# Patient Record
Sex: MALE | Race: WHITE | NOT HISPANIC OR LATINO | Employment: FULL TIME | URBAN - METROPOLITAN AREA
[De-identification: names, ages, dates, MRNs, and addresses within clinical notes are randomized per-mention and may not be internally consistent; named-entity substitution may affect disease eponyms.]

---

## 2017-08-13 ENCOUNTER — HOSPITAL ENCOUNTER (OUTPATIENT)
Dept: GENERAL RADIOLOGY | Facility: HOSPITAL | Age: 52
Discharge: HOME | End: 2017-08-13
Admitting: SURGERY

## 2017-08-13 ENCOUNTER — APPOINTMENT (OUTPATIENT)
Dept: PREADMISSION TESTING | Facility: HOSPITAL | Age: 52
End: 2017-08-13

## 2017-08-13 VITALS — BODY MASS INDEX: 25.49 KG/M2 | WEIGHT: 168.21 LBS | HEIGHT: 68 IN

## 2017-08-13 LAB
ANION GAP SERPL CALCULATED.3IONS-SCNC: 7 MMOL/L (ref 3–11)
BACTERIA UR QL AUTO: NORMAL /HPF
BILIRUB UR QL STRIP: NEGATIVE
BUN BLD-MCNC: 15 MG/DL (ref 9–23)
BUN/CREAT SERPL: 15 (ref 7–25)
CALCIUM SPEC-SCNC: 9.4 MG/DL (ref 8.7–10.4)
CHLORIDE SERPL-SCNC: 107 MMOL/L (ref 99–109)
CLARITY UR: ABNORMAL
CO2 SERPL-SCNC: 27 MMOL/L (ref 20–31)
COLOR UR: YELLOW
CREAT BLD-MCNC: 1 MG/DL (ref 0.6–1.3)
DEPRECATED RDW RBC AUTO: 44.7 FL (ref 37–54)
ERYTHROCYTE [DISTWIDTH] IN BLOOD BY AUTOMATED COUNT: 12.8 % (ref 11.3–14.5)
GFR SERPL CREATININE-BSD FRML MDRD: 79 ML/MIN/1.73
GLUCOSE BLD-MCNC: 98 MG/DL (ref 70–100)
GLUCOSE UR STRIP-MCNC: NEGATIVE MG/DL
HBA1C MFR BLD: 5 % (ref 4.8–5.6)
HCT VFR BLD AUTO: 44.6 % (ref 38.9–50.9)
HGB BLD-MCNC: 15.3 G/DL (ref 13.1–17.5)
HGB UR QL STRIP.AUTO: NEGATIVE
HYALINE CASTS UR QL AUTO: NORMAL /LPF
KETONES UR QL STRIP: NEGATIVE
LEUKOCYTE ESTERASE UR QL STRIP.AUTO: NEGATIVE
MCH RBC QN AUTO: 32.5 PG (ref 27–31)
MCHC RBC AUTO-ENTMCNC: 34.3 G/DL (ref 32–36)
MCV RBC AUTO: 94.7 FL (ref 80–99)
NITRITE UR QL STRIP: NEGATIVE
PH UR STRIP.AUTO: 7 [PH] (ref 5–8)
PLATELET # BLD AUTO: 255 10*3/MM3 (ref 150–450)
PMV BLD AUTO: 9.1 FL (ref 6–12)
POTASSIUM BLD-SCNC: 4.5 MMOL/L (ref 3.5–5.5)
PROT UR QL STRIP: NEGATIVE
RBC # BLD AUTO: 4.71 10*6/MM3 (ref 4.2–5.76)
RBC # UR: NORMAL /HPF
REF LAB TEST METHOD: NORMAL
SODIUM BLD-SCNC: 141 MMOL/L (ref 132–146)
SP GR UR STRIP: 1.02 (ref 1–1.03)
SQUAMOUS #/AREA URNS HPF: NORMAL /HPF
UROBILINOGEN UR QL STRIP: ABNORMAL
WBC NRBC COR # BLD: 8.12 10*3/MM3 (ref 3.5–10.8)
WBC UR QL AUTO: NORMAL /HPF

## 2017-08-13 PROCEDURE — 93010 ELECTROCARDIOGRAM REPORT: CPT | Performed by: INTERNAL MEDICINE

## 2017-08-13 PROCEDURE — 36415 COLL VENOUS BLD VENIPUNCTURE: CPT

## 2017-08-13 PROCEDURE — 83036 HEMOGLOBIN GLYCOSYLATED A1C: CPT | Performed by: SURGERY

## 2017-08-13 PROCEDURE — 71020 HC CHEST PA AND LATERAL: CPT

## 2017-08-13 PROCEDURE — 93005 ELECTROCARDIOGRAM TRACING: CPT

## 2017-08-13 PROCEDURE — 81001 URINALYSIS AUTO W/SCOPE: CPT | Performed by: SURGERY

## 2017-08-13 PROCEDURE — 85027 COMPLETE CBC AUTOMATED: CPT | Performed by: SURGERY

## 2017-08-13 PROCEDURE — 80048 BASIC METABOLIC PNL TOTAL CA: CPT | Performed by: SURGERY

## 2017-08-13 RX ORDER — ASPIRIN 81 MG/1
81 TABLET ORAL DAILY
COMMUNITY

## 2017-08-13 RX ORDER — ATORVASTATIN CALCIUM 80 MG/1
80 TABLET, FILM COATED ORAL DAILY
COMMUNITY

## 2017-08-14 ENCOUNTER — ANESTHESIA (OUTPATIENT)
Dept: PERIOP | Facility: HOSPITAL | Age: 52
End: 2017-08-14

## 2017-08-14 ENCOUNTER — ANESTHESIA EVENT (OUTPATIENT)
Dept: PERIOP | Facility: HOSPITAL | Age: 52
End: 2017-08-14

## 2017-08-14 ENCOUNTER — APPOINTMENT (OUTPATIENT)
Dept: CARDIOLOGY | Facility: HOSPITAL | Age: 52
End: 2017-08-14
Attending: SURGERY

## 2017-08-14 ENCOUNTER — HOSPITAL ENCOUNTER (INPATIENT)
Facility: HOSPITAL | Age: 52
LOS: 1 days | Discharge: HOME | End: 2017-08-15
Attending: SURGERY | Admitting: SURGERY

## 2017-08-14 PROBLEM — K21.9 GERD (GASTROESOPHAGEAL REFLUX DISEASE): Status: ACTIVE | Noted: 2017-08-14

## 2017-08-14 PROBLEM — E78.5 HYPERLIPIDEMIA: Status: ACTIVE | Noted: 2017-08-14

## 2017-08-14 PROBLEM — G45.3 AMAUROSIS FUGAX OF RIGHT EYE: Status: ACTIVE | Noted: 2017-08-14

## 2017-08-14 PROBLEM — I65.29 CAROTID ARTERY STENOSIS: Status: ACTIVE | Noted: 2017-08-14

## 2017-08-14 PROBLEM — Z72.0 TOBACCO ABUSE: Status: ACTIVE | Noted: 2017-08-14

## 2017-08-14 PROCEDURE — 25010000002 DEXAMETHASONE PER 1 MG: Performed by: NURSE ANESTHETIST, CERTIFIED REGISTERED

## 2017-08-14 PROCEDURE — 25010000002 PROPOFOL 10 MG/ML EMULSION: Performed by: NURSE ANESTHETIST, CERTIFIED REGISTERED

## 2017-08-14 PROCEDURE — 25010000002 NEOSTIGMINE PER 0.5 MG: Performed by: NURSE ANESTHETIST, CERTIFIED REGISTERED

## 2017-08-14 PROCEDURE — 03UK0JZ SUPPLEMENT RIGHT INTERNAL CAROTID ARTERY WITH SYNTHETIC SUBSTITUTE, OPEN APPROACH: ICD-10-PCS | Performed by: SURGERY

## 2017-08-14 PROCEDURE — 25010000002 HEPARIN (PORCINE) PER 1000 UNITS: Performed by: NURSE ANESTHETIST, CERTIFIED REGISTERED

## 2017-08-14 PROCEDURE — 25010000002 PHENYLEPHRINE PER 1 ML: Performed by: NURSE ANESTHETIST, CERTIFIED REGISTERED

## 2017-08-14 PROCEDURE — 93880 EXTRACRANIAL BILAT STUDY: CPT

## 2017-08-14 PROCEDURE — 25010000002 FENTANYL CITRATE (PF) 100 MCG/2ML SOLUTION: Performed by: NURSE ANESTHETIST, CERTIFIED REGISTERED

## 2017-08-14 PROCEDURE — C1768 GRAFT, VASCULAR: HCPCS | Performed by: SURGERY

## 2017-08-14 PROCEDURE — 25010000002 ONDANSETRON PER 1 MG: Performed by: NURSE ANESTHETIST, CERTIFIED REGISTERED

## 2017-08-14 PROCEDURE — 99232 SBSQ HOSP IP/OBS MODERATE 35: CPT | Performed by: INTERNAL MEDICINE

## 2017-08-14 PROCEDURE — 03CK0ZZ EXTIRPATION OF MATTER FROM RIGHT INTERNAL CAROTID ARTERY, OPEN APPROACH: ICD-10-PCS | Performed by: SURGERY

## 2017-08-14 PROCEDURE — 25010000002 HEPARIN (PORCINE) PER 1000 UNITS: Performed by: SURGERY

## 2017-08-14 PROCEDURE — 25010000003 CEFAZOLIN IN DEXTROSE 2-4 GM/100ML-% SOLUTION: Performed by: SURGERY

## 2017-08-14 DEVICE — THIN WALL CAROTID PATCH GELATIN IMPREGNATED THIN WALL KNITTED CAROTID PATCH TAPERED PATCH
Type: IMPLANTABLE DEVICE | Site: CAROTID | Status: FUNCTIONAL
Brand: THINWALL

## 2017-08-14 RX ORDER — DEXAMETHASONE SODIUM PHOSPHATE 4 MG/ML
INJECTION, SOLUTION INTRA-ARTICULAR; INTRALESIONAL; INTRAMUSCULAR; INTRAVENOUS; SOFT TISSUE AS NEEDED
Status: DISCONTINUED | OUTPATIENT
Start: 2017-08-14 | End: 2017-08-14 | Stop reason: SURG

## 2017-08-14 RX ORDER — FAMOTIDINE 20 MG/1
20 TABLET, FILM COATED ORAL ONCE
Status: COMPLETED | OUTPATIENT
Start: 2017-08-14 | End: 2017-08-14

## 2017-08-14 RX ORDER — SODIUM CHLORIDE 0.9 % (FLUSH) 0.9 %
1-10 SYRINGE (ML) INJECTION AS NEEDED
Status: DISCONTINUED | OUTPATIENT
Start: 2017-08-14 | End: 2017-08-14 | Stop reason: HOSPADM

## 2017-08-14 RX ORDER — SODIUM CHLORIDE, SODIUM LACTATE, POTASSIUM CHLORIDE, CALCIUM CHLORIDE 600; 310; 30; 20 MG/100ML; MG/100ML; MG/100ML; MG/100ML
75 INJECTION, SOLUTION INTRAVENOUS CONTINUOUS
Status: DISCONTINUED | OUTPATIENT
Start: 2017-08-14 | End: 2017-08-14

## 2017-08-14 RX ORDER — CEFAZOLIN SODIUM 2 G/100ML
2 INJECTION, SOLUTION INTRAVENOUS ONCE
Status: COMPLETED | OUTPATIENT
Start: 2017-08-14 | End: 2017-08-14

## 2017-08-14 RX ORDER — ONDANSETRON 2 MG/ML
INJECTION INTRAMUSCULAR; INTRAVENOUS AS NEEDED
Status: DISCONTINUED | OUTPATIENT
Start: 2017-08-14 | End: 2017-08-14 | Stop reason: SURG

## 2017-08-14 RX ORDER — SODIUM CHLORIDE, SODIUM LACTATE, POTASSIUM CHLORIDE, CALCIUM CHLORIDE 600; 310; 30; 20 MG/100ML; MG/100ML; MG/100ML; MG/100ML
10 INJECTION, SOLUTION INTRAVENOUS CONTINUOUS
Status: DISCONTINUED | OUTPATIENT
Start: 2017-08-14 | End: 2017-08-15 | Stop reason: HOSPADM

## 2017-08-14 RX ORDER — ACETAMINOPHEN 325 MG/1
650 TABLET ORAL EVERY 4 HOURS PRN
Status: DISCONTINUED | OUTPATIENT
Start: 2017-08-14 | End: 2017-08-15 | Stop reason: HOSPADM

## 2017-08-14 RX ORDER — HEPARIN SODIUM 10000 [USP'U]/ML
INJECTION, SOLUTION INTRAVENOUS; SUBCUTANEOUS AS NEEDED
Status: DISCONTINUED | OUTPATIENT
Start: 2017-08-14 | End: 2017-08-14 | Stop reason: HOSPADM

## 2017-08-14 RX ORDER — SODIUM CHLORIDE 9 MG/ML
INJECTION, SOLUTION INTRAVENOUS AS NEEDED
Status: DISCONTINUED | OUTPATIENT
Start: 2017-08-14 | End: 2017-08-14 | Stop reason: HOSPADM

## 2017-08-14 RX ORDER — THIAMINE MONONITRATE (VIT B1) 100 MG
100 TABLET ORAL DAILY
Status: DISCONTINUED | OUTPATIENT
Start: 2017-08-14 | End: 2017-08-15 | Stop reason: HOSPADM

## 2017-08-14 RX ORDER — FAMOTIDINE 10 MG/ML
20 INJECTION, SOLUTION INTRAVENOUS ONCE
Status: CANCELLED | OUTPATIENT
Start: 2017-08-14 | End: 2017-08-14

## 2017-08-14 RX ORDER — FENTANYL CITRATE 50 UG/ML
INJECTION, SOLUTION INTRAMUSCULAR; INTRAVENOUS AS NEEDED
Status: DISCONTINUED | OUTPATIENT
Start: 2017-08-14 | End: 2017-08-14 | Stop reason: SURG

## 2017-08-14 RX ORDER — HEPARIN SODIUM 1000 [USP'U]/ML
INJECTION, SOLUTION INTRAVENOUS; SUBCUTANEOUS AS NEEDED
Status: DISCONTINUED | OUTPATIENT
Start: 2017-08-14 | End: 2017-08-14 | Stop reason: SURG

## 2017-08-14 RX ORDER — PROPOFOL 10 MG/ML
VIAL (ML) INTRAVENOUS AS NEEDED
Status: DISCONTINUED | OUTPATIENT
Start: 2017-08-14 | End: 2017-08-14 | Stop reason: SURG

## 2017-08-14 RX ORDER — NALOXONE HCL 0.4 MG/ML
0.4 VIAL (ML) INJECTION
Status: DISCONTINUED | OUTPATIENT
Start: 2017-08-14 | End: 2017-08-15 | Stop reason: HOSPADM

## 2017-08-14 RX ORDER — ASPIRIN 81 MG/1
81 TABLET ORAL DAILY
Status: DISCONTINUED | OUTPATIENT
Start: 2017-08-14 | End: 2017-08-15 | Stop reason: HOSPADM

## 2017-08-14 RX ORDER — SODIUM CHLORIDE, SODIUM LACTATE, POTASSIUM CHLORIDE, CALCIUM CHLORIDE 600; 310; 30; 20 MG/100ML; MG/100ML; MG/100ML; MG/100ML
9 INJECTION, SOLUTION INTRAVENOUS CONTINUOUS
Status: DISCONTINUED | OUTPATIENT
Start: 2017-08-14 | End: 2017-08-14 | Stop reason: SDUPTHER

## 2017-08-14 RX ORDER — MORPHINE SULFATE 2 MG/ML
1 INJECTION, SOLUTION INTRAMUSCULAR; INTRAVENOUS EVERY 4 HOURS PRN
Status: DISCONTINUED | OUTPATIENT
Start: 2017-08-14 | End: 2017-08-15 | Stop reason: HOSPADM

## 2017-08-14 RX ORDER — CEFAZOLIN SODIUM 2 G/100ML
2 INJECTION, SOLUTION INTRAVENOUS EVERY 8 HOURS
Status: COMPLETED | OUTPATIENT
Start: 2017-08-14 | End: 2017-08-15

## 2017-08-14 RX ORDER — FENTANYL CITRATE 50 UG/ML
50 INJECTION, SOLUTION INTRAMUSCULAR; INTRAVENOUS
Status: DISCONTINUED | OUTPATIENT
Start: 2017-08-14 | End: 2017-08-14 | Stop reason: HOSPADM

## 2017-08-14 RX ORDER — LIDOCAINE HYDROCHLORIDE 10 MG/ML
INJECTION, SOLUTION INFILTRATION; PERINEURAL AS NEEDED
Status: DISCONTINUED | OUTPATIENT
Start: 2017-08-14 | End: 2017-08-14 | Stop reason: SURG

## 2017-08-14 RX ORDER — DIPHENOXYLATE HYDROCHLORIDE AND ATROPINE SULFATE 2.5; .025 MG/1; MG/1
1 TABLET ORAL DAILY
Status: DISCONTINUED | OUTPATIENT
Start: 2017-08-14 | End: 2017-08-15 | Stop reason: HOSPADM

## 2017-08-14 RX ORDER — ONDANSETRON 2 MG/ML
4 INJECTION INTRAMUSCULAR; INTRAVENOUS ONCE AS NEEDED
Status: DISCONTINUED | OUTPATIENT
Start: 2017-08-14 | End: 2017-08-14 | Stop reason: HOSPADM

## 2017-08-14 RX ORDER — ATORVASTATIN CALCIUM 40 MG/1
80 TABLET, FILM COATED ORAL DAILY
Status: DISCONTINUED | OUTPATIENT
Start: 2017-08-14 | End: 2017-08-15 | Stop reason: HOSPADM

## 2017-08-14 RX ORDER — SODIUM CHLORIDE 0.9 % (FLUSH) 0.9 %
1-10 SYRINGE (ML) INJECTION AS NEEDED
Status: DISCONTINUED | OUTPATIENT
Start: 2017-08-14 | End: 2017-08-15 | Stop reason: HOSPADM

## 2017-08-14 RX ORDER — OXYCODONE HYDROCHLORIDE AND ACETAMINOPHEN 5; 325 MG/1; MG/1
2 TABLET ORAL EVERY 4 HOURS PRN
Status: DISCONTINUED | OUTPATIENT
Start: 2017-08-14 | End: 2017-08-15 | Stop reason: HOSPADM

## 2017-08-14 RX ORDER — LIDOCAINE HYDROCHLORIDE 10 MG/ML
INJECTION, SOLUTION EPIDURAL; INFILTRATION; INTRACAUDAL; PERINEURAL AS NEEDED
Status: DISCONTINUED | OUTPATIENT
Start: 2017-08-14 | End: 2017-08-14 | Stop reason: HOSPADM

## 2017-08-14 RX ORDER — CEFAZOLIN SODIUM 2 G/100ML
2 INJECTION, SOLUTION INTRAVENOUS ONCE
Status: DISCONTINUED | OUTPATIENT
Start: 2017-08-14 | End: 2017-08-14 | Stop reason: SDUPTHER

## 2017-08-14 RX ORDER — ESMOLOL HYDROCHLORIDE 10 MG/ML
INJECTION INTRAVENOUS AS NEEDED
Status: DISCONTINUED | OUTPATIENT
Start: 2017-08-14 | End: 2017-08-14 | Stop reason: SURG

## 2017-08-14 RX ORDER — ATRACURIUM BESYLATE 10 MG/ML
INJECTION, SOLUTION INTRAVENOUS AS NEEDED
Status: DISCONTINUED | OUTPATIENT
Start: 2017-08-14 | End: 2017-08-14 | Stop reason: SURG

## 2017-08-14 RX ORDER — FOLIC ACID 1 MG/1
1 TABLET ORAL DAILY
Status: DISCONTINUED | OUTPATIENT
Start: 2017-08-14 | End: 2017-08-15 | Stop reason: HOSPADM

## 2017-08-14 RX ORDER — LIDOCAINE HYDROCHLORIDE 10 MG/ML
0.5 INJECTION, SOLUTION EPIDURAL; INFILTRATION; INTRACAUDAL; PERINEURAL ONCE AS NEEDED
Status: COMPLETED | OUTPATIENT
Start: 2017-08-14 | End: 2017-08-14

## 2017-08-14 RX ORDER — MORPHINE SULFATE 2 MG/ML
2 INJECTION, SOLUTION INTRAMUSCULAR; INTRAVENOUS EVERY 4 HOURS PRN
Status: DISCONTINUED | OUTPATIENT
Start: 2017-08-14 | End: 2017-08-15 | Stop reason: HOSPADM

## 2017-08-14 RX ADMIN — FENTANYL CITRATE 50 MCG: 50 INJECTION, SOLUTION INTRAMUSCULAR; INTRAVENOUS at 14:35

## 2017-08-14 RX ADMIN — FENTANYL CITRATE 50 MCG: 50 INJECTION, SOLUTION INTRAMUSCULAR; INTRAVENOUS at 11:17

## 2017-08-14 RX ADMIN — CEFAZOLIN SODIUM 2 G: 2 INJECTION, SOLUTION INTRAVENOUS at 11:12

## 2017-08-14 RX ADMIN — Medication 100 MG: at 17:59

## 2017-08-14 RX ADMIN — SODIUM CHLORIDE 2 MG/HR: 9 INJECTION, SOLUTION INTRAVENOUS at 12:51

## 2017-08-14 RX ADMIN — FENTANYL CITRATE 50 MCG: 50 INJECTION, SOLUTION INTRAMUSCULAR; INTRAVENOUS at 11:35

## 2017-08-14 RX ADMIN — FAMOTIDINE 20 MG: 20 TABLET ORAL at 09:20

## 2017-08-14 RX ADMIN — PHENYLEPHRINE HYDROCHLORIDE 0.5 MCG: 10 INJECTION INTRAVENOUS at 12:20

## 2017-08-14 RX ADMIN — ESMOLOL HYDROCHLORIDE 30 MG: 10 INJECTION, SOLUTION INTRAVENOUS at 12:15

## 2017-08-14 RX ADMIN — FENTANYL CITRATE 100 MCG: 50 INJECTION, SOLUTION INTRAMUSCULAR; INTRAVENOUS at 13:39

## 2017-08-14 RX ADMIN — SODIUM CHLORIDE, POTASSIUM CHLORIDE, SODIUM LACTATE AND CALCIUM CHLORIDE: 600; 310; 30; 20 INJECTION, SOLUTION INTRAVENOUS at 12:15

## 2017-08-14 RX ADMIN — SODIUM CHLORIDE, POTASSIUM CHLORIDE, SODIUM LACTATE AND CALCIUM CHLORIDE: 600; 310; 30; 20 INJECTION, SOLUTION INTRAVENOUS at 13:30

## 2017-08-14 RX ADMIN — FENTANYL CITRATE 50 MCG: 50 INJECTION, SOLUTION INTRAMUSCULAR; INTRAVENOUS at 14:11

## 2017-08-14 RX ADMIN — OXYCODONE AND ACETAMINOPHEN 2 TABLET: 5; 325 TABLET ORAL at 23:06

## 2017-08-14 RX ADMIN — LIDOCAINE HYDROCHLORIDE 50 MG: 10 INJECTION, SOLUTION INFILTRATION; PERINEURAL at 11:17

## 2017-08-14 RX ADMIN — ATORVASTATIN CALCIUM 80 MG: 40 TABLET, FILM COATED ORAL at 15:43

## 2017-08-14 RX ADMIN — FOLIC ACID 1 MG: 1 TABLET ORAL at 17:59

## 2017-08-14 RX ADMIN — ASPIRIN 81 MG: 81 TABLET, COATED ORAL at 15:43

## 2017-08-14 RX ADMIN — LIDOCAINE HYDROCHLORIDE 0.2 ML: 10 INJECTION, SOLUTION EPIDURAL; INFILTRATION; INTRACAUDAL; PERINEURAL at 09:19

## 2017-08-14 RX ADMIN — OXYCODONE AND ACETAMINOPHEN 2 TABLET: 5; 325 TABLET ORAL at 19:26

## 2017-08-14 RX ADMIN — SODIUM CHLORIDE, POTASSIUM CHLORIDE, SODIUM LACTATE AND CALCIUM CHLORIDE 9 ML/HR: 600; 310; 30; 20 INJECTION, SOLUTION INTRAVENOUS at 09:19

## 2017-08-14 RX ADMIN — SODIUM CHLORIDE, POTASSIUM CHLORIDE, SODIUM LACTATE AND CALCIUM CHLORIDE: 600; 310; 30; 20 INJECTION, SOLUTION INTRAVENOUS at 11:10

## 2017-08-14 RX ADMIN — Medication 1 TABLET: at 17:59

## 2017-08-14 RX ADMIN — SODIUM CHLORIDE, POTASSIUM CHLORIDE, SODIUM LACTATE AND CALCIUM CHLORIDE 75 ML/HR: 600; 310; 30; 20 INJECTION, SOLUTION INTRAVENOUS at 15:24

## 2017-08-14 RX ADMIN — PROPOFOL 150 MG: 10 INJECTION, EMULSION INTRAVENOUS at 11:17

## 2017-08-14 RX ADMIN — ESMOLOL HYDROCHLORIDE 30 MG: 10 INJECTION, SOLUTION INTRAVENOUS at 11:17

## 2017-08-14 RX ADMIN — CEFAZOLIN SODIUM 2 G: 2 INJECTION, SOLUTION INTRAVENOUS at 17:59

## 2017-08-14 RX ADMIN — ONDANSETRON 4 MG: 2 INJECTION INTRAMUSCULAR; INTRAVENOUS at 13:09

## 2017-08-14 RX ADMIN — ATRACURIUM BESYLATE 50 MG: 10 INJECTION, SOLUTION INTRAVENOUS at 11:17

## 2017-08-14 RX ADMIN — DEXAMETHASONE SODIUM PHOSPHATE 8 MG: 4 INJECTION, SOLUTION INTRAMUSCULAR; INTRAVENOUS at 11:25

## 2017-08-14 RX ADMIN — HEPARIN SODIUM 5000 UNITS: 1000 INJECTION, SOLUTION INTRAVENOUS; SUBCUTANEOUS at 11:57

## 2017-08-14 RX ADMIN — Medication 3 MG: at 13:46

## 2017-08-14 NOTE — PLAN OF CARE
Problem: Perioperative Period (Adult)  Goal: Signs and Symptoms of Listed Potential Problems Will be Absent or Manageable (Perioperative Period)  Outcome: Ongoing (interventions implemented as appropriate)    08/14/17 0971   Perioperative Period   Problems Assessed (Perioperative Period) pain   Problems Present (Perioperative Period) none

## 2017-08-14 NOTE — ANESTHESIA POSTPROCEDURE EVALUATION
Patient: Ammon Kimble    Procedure Summary     Date Anesthesia Start Anesthesia Stop Room / Location    08/14/17 1110  [facility] / [facility]       Procedure Diagnosis Surgeon Provider    RIGHT CAROTID ENDARTERECTOMY, PATCH ANGIOPLASTY (Right Neck) No diagnosis on file. MD Mendoza York MD          Anesthesia Type: general  Last vitals  BP    104/79   Temp 97.7   Pulse 65   Resp 18   SpO2 100     Post Anesthesia Care and Evaluation    Patient location during evaluation: PACU  Patient participation: complete - patient participated  Level of consciousness: awake and alert  Pain score: 3  Pain management: adequate  Airway patency: patent  Anesthetic complications: No anesthetic complications  PONV Status: none  Cardiovascular status: hemodynamically stable and acceptable  Respiratory status: nonlabored ventilation, acceptable and nasal cannula  Hydration status: acceptable

## 2017-08-14 NOTE — PROGRESS NOTES
"INTENSIVIST NOTE     Hospital:  LOS: 1 day   Mr. Ammon Kimble, 51 y.o. male is followed for:   Principal Problem:    Carotid artery stenosis  Active Problems:    Amaurosis fugax of right eye    Hyperlipidemia    GERD (gastroesophageal reflux disease)    Tobacco abuse          SUBJECTIVE   Subjective    Mr. Kimble is a 52 y/o WM who was admitted today for an elective Rt CEA per Dr. Good.  He had some vision changes in his right eye that started about 2 weeks ago.  He had had a TIA in the past.  He quit smoking about 3 days ago.  He does drink about 30 cans of beer weekly.  He is a beer taster for a brewery.      Interval History:  See in ICU postoperatively.  Has some postoperative neck pain and complaining of some lip and tongue numbness.      The patient's relevant past medical, surgical and social history were reviewed and updated in Epic as appropriate.        OBJECTIVE     Vital Sign Min/Max for last 24 hours  Temp  Min: 97.7 °F (36.5 °C)  Max: 98.2 °F (36.8 °C)   BP  Min: 94/60  Max: 157/103   Pulse  Min: 56  Max: 99   Resp  Min: 14  Max: 16   SpO2  Min: 93 %  Max: 99 %   Flow (L/min)  Min: 2  Max: 4           Intake/Output Summary (Last 24 hours) at 08/14/17 1629  Last data filed at 08/14/17 1340   Gross per 24 hour   Intake             2000 ml   Output              100 ml   Net             1900 ml      Flowsheet Rows         First Filed Value    Admission Height  68\" (172.7 cm) Documented at 08/14/2017 0908    Admission Weight  168 lb (76.2 kg) Documented at 08/14/2017 0908        Body mass index is 25.54 kg/(m^2).   Last 3 weights    08/14/17  0908   Weight: 168 lb (76.2 kg)        Telemetry: SR      Medications: (drips)    lactated ringers 75 mL/hr Last Rate: 75 mL/hr (08/14/17 1524)   niCARdipine 5-15 mg/hr      Medications:    aspirin 81 mg Oral Daily   atorvastatin 80 mg Oral Daily   niCARdipine 5-15 mg/hr Intravenous Once     Objective:  General Appearance:  In no acute distress.    Vital signs: (most " "recent): Blood pressure 104/71, pulse 68, temperature 97.9 °F (36.6 °C), temperature source Temporal Artery , resp. rate 16, height 68\" (172.7 cm), weight 168 lb (76.2 kg), SpO2 97 %.    HEENT: (Right neck wound dressed without drainage or swelling)    Lungs:  Normal respiratory rate and normal effort.  He is not in respiratory distress.  Breath sounds clear to auscultation.  No wheezes, rales or rhonchi.    Heart: Normal rate.  Regular rhythm.  S1 normal and S2 normal.  No murmur, gallop or friction rub.   Chest: Symmetric chest wall expansion.   Abdomen: Abdomen is soft and non-distended.  Bowel sounds are normal.   There is no abdominal tenderness.   There is no mass. There is no splenomegaly. There is no hepatomegaly.   Extremities: There is no deformity or dependent edema.    Neurological: Patient is alert and oriented to person, place and time.    Pupils:  Pupils are equal, round, and reactive to light.    Skin:  Warm and dry.                Results from last 7 days  Lab Units 08/13/17  1241   WBC 10*3/mm3 8.12   HEMOGLOBIN g/dL 15.3   PLATELETS 10*3/mm3 255       Results from last 7 days  Lab Units 08/13/17  1241   SODIUM mmol/L 141   POTASSIUM mmol/L 4.5   CO2 mmol/L 27.0   CREATININE mg/dL 1.00   GLUCOSE mg/dL 98     Estimated Creatinine Clearance: 95.3 mL/min (by C-G formula based on Cr of 1).      No results found for: BNP    Results from last 7 days  Lab Units 08/13/17  1241   HEMOGLOBIN A1C % 5.00          I reviewed the patient's results and images.     Assessment/Plan   ASSESSMENT / PLAN     51 y.o.male:    Hospital Problem List     * (Principal)Carotid artery stenosis    Overview Signed 8/14/2017  3:44 PM by ERIC Manriquez     S/p Rt CEA 8/14/17         Amaurosis fugax of right eye    Hyperlipidemia    GERD (gastroesophageal reflux disease)    Tobacco abuse           Assessment & Plan     51-year-old male seen postoperatively after carotid endarterectomy.    -Admission to ICU " postoperatively  -Careful blood pressure control  -Monitor wound  -Smoking cessation  -Multivitamin, thiamine, folic acid     Plan of care and goals reviewed with mulitdisciplinary team at daily rounds.      I discussed the patient's findings and my recommendations with patient       ERIC Jones, AGACNP-BC, FNP-BC  Pulmonary & Critical Care Medicine       I have seen and examined patient, performing a face-to-face diagnostic evaluation with plan of care reviewed and developed with APRN and nursing staff. I have addended and modified the above history of present illness, physical examination, and assessment and plan to reflect my findings and impressions.    Martin Watkins MD  Pulmonary and Critical Care Medicine

## 2017-08-14 NOTE — ANESTHESIA PREPROCEDURE EVALUATION
Anesthesia Evaluation     Patient summary reviewed and Nursing notes reviewed   NPO Solid Status: > 8 hours  NPO Liquid Status: > 8 hours     Airway   Mallampati: II  TM distance: >3 FB  Neck ROM: full  no difficulty expected  Dental      Pulmonary    (+) a smoker Current,   Cardiovascular     ECG reviewed    (+) hyperlipidemia    ROS comment: EKG NSR    Neuro/Psych  (+) TIA,    GI/Hepatic/Renal/Endo    (+)  GERD,     Musculoskeletal     Abdominal    Substance History      OB/GYN          Other                                      Anesthesia Plan    ASA 3     general   (A line )  intravenous induction   Anesthetic plan and risks discussed with patient.    Plan discussed with CRNA.

## 2017-08-14 NOTE — ANESTHESIA PROCEDURE NOTES
Arterial Line    Patient location during procedure: pre-op   Line placed for hemodynamic monitoring.  Performed By   CRNA: RAUL DUNLAP  Preanesthetic Checklist  Completed: patient identified, site marked, surgical consent, pre-op evaluation, timeout performed, IV checked, risks and benefits discussed and monitors and equipment checked  Arterial Line Prep   Sterile Tech: cap, gloves and sterile barriers  Prep: ChloraPrep  Patient monitoring: blood pressure monitoring, continuous pulse oximetry and EKG  Arterial Line Procedure   Laterality:right  Location:  radial artery  Catheter size: 20 G   Guidance: palpation technique  Number of attempts: 1  Successful placement: yes          Post Assessment   Dressing Type: line sutured, occlusive dressing applied, secured with tape and wrist guard applied.   Complications no  Circ/Move/Sens Assessment: normal and unchanged.   Patient Tolerance: patient tolerated the procedure well with no apparent complications

## 2017-08-14 NOTE — BRIEF OP NOTE
BRIEF OPERATIVE NOTE     Ammon Kimble  8/14/2017    Pre-op Diagnosis:   Right carotid artery stenosis with amaurosis fugax.    Post-op Diagnosis:     Same    Procedure:    Surgeon(s):  Jonathan Good MD    Asst.  Josué Ellington MD    Anesthesia: General    Staff:   Circulator: Ana María Goff; Kenroy Khan RN; Flo Griffin RN  Scrub Person: Miriam Wall; Scott Duke  Nursing Assistant: Melecio Griffith; Pascale Mayberry    Estimated Blood Loss: 100 mL    Fluids:  2000 ml    Findings:  Near occlusive plaque with a small amount of organized thrombus distally    Complications:  None      Jonathan Good MD   8/14/2017  2:08 PM

## 2017-08-14 NOTE — PROGRESS NOTES
VASCULAR SURGERY PROGRESS NOTE     08/14/17    Ammon Kimble  [id number]  1965    SUBJECTIVE  Some tongue numbness, improving  Some neck pain    OBJECTIVE    Vital Signs  Temp:  [97.7 °F (36.5 °C)-98.2 °F (36.8 °C)] 97.9 °F (36.6 °C)  Heart Rate:  [56-99] 74  Resp:  [14-16] 16  BP: ()/() 99/31  Arterial Line BP: ()/(44-77) 99/59    Physical Exam:    General: Alert, cooperative, in no acute distress    Neck:  Dressing intact, no hematoma    Neuro: Alert and oriented x 3, CN grossly intact,     no gross motor or sensory deficits    ASSESSMENT   1.  Excellent recovery after CEA    PLAN  1.  BP control  2.  Probable D/C in AM    Jonathan Good MD  08/14/17  4:55 PM

## 2017-08-14 NOTE — OP NOTE
VASCULAR SURGERY OPERATIVE NOTE     Ammon Kimble  8/14/2017    Preop Diagnosis  Right carotid artery stenosis with amaurosis fugax    Postop Diagnosis  Same    Procedure  Right carotid endarterectomy with Dacron patch angioplasty  Intraoperative completion duplex ultrasonography    Surgeon  Jonathan Good M.D.    Assistant  Mendoza Ellington M.D.     Anesthesia  General        INDICATIONS:  This 51-year-old male presents with a a recent history of 3 episodes of right eye amaurosis fugax within the past that 2 weeks.  Duplex on sonography demonstrated a greater than 80% right internal carotid artery stenosis.  The patient was admitted today for right carotid endarterectomy per NASCE T recommendations    FINDINGS/INTERPRETATION:  1.  Completion duplex ultrasound.  B-mode imaging demonstrated no defect in the endarterectomy site.  Color-flow Doppler demonstrated that the common carotid, internal carotid, and external carotid arteries are patent.  Spectral analysis demonstrated normal flow velocities in all vessels.  This represents a normal post endarterectomy completion duplex study.  2.  Extensive internal carotid artery plaque was present.  There was near occlusive stenosis in the distal internal carotid artery with a small amount of organized intraluminal thrombus.    PROCEDURE:  After satisfactory induction of general anesthesia and infusion of IV antibiotics, the patient's right neck was prepped and draped in sterile fashion.  An oblique incision was made in the neck overlying the carotid bifurcation.  This was taken down through platysma with cautery.  Subplatysmal flaps were then raised proximally and distally.  The anterior border the sternocleidomastoid muscle was then dissected laterally.  The internal jugular vein was identified and dissected.  Facial vein branches were then ligated in continuity with silk ties.  The vein was retracted laterally.  The common carotid artery was carefully dissected  circumferentially and encircled with Vesseloops.  The distal internal carotid artery was similarly dissected and encircled with a vessel loop.  The external carotid artery was dissected only sufficiently to place a clamp.  The remainder the bulb dissection was deferred until after clamping.  The patient was then given intravenous heparin.  The external carotid and common carotid arteries were then clamped.  Stump pressures were 84/63 with a systemic pressure 153/84.  Therefore a shunt was not utilized.  The distal internal carotid was then clamped.  The remainder the bulb dissection was then performed.  A longitudinal arteriotomy was made in the distal common carotid artery and extended into the internal carotid artery with Strange scissors.  An endarterectomy plane was then established and the proximal plaque transected sharply.  An eversion endarterectomy of the external carotid artery was then performed.  The plaque was then feathered into the distal internal carotid artery and removed.  All fronds of media were carefully removed using forceps and heparinized saline irrigation.  The arteriotomy was then closed with a gelatin impregnated knitted Dacron patch.  6-0 polypropylene sutures were placed at each apex and run to the center.  Prior to completion of suture line, appropriate flushing maneuvers were performed.  The internal carotid with was then briefly unclamped and then reclamped.  Flow was then restored to the external carotid circulation with the bulb contents milked into the external carotid artery.  The internal carotid artery was then unclamped.  Total clamp time was 51 minutes.  Completion duplex ultrasonography was then performed with a Bar iE 33 ultrasound machine and a 15 MHz probe with findings as described above.  Hemostasis was obtained with clips and cautery.  The operative field was irrigated extensively with antibiotic irrigation.  Platysma was then closed with running 3-0 Vicryl.  Skin  was then closed with running 4-0 Monocryl in a subcuticular fashion.  Mastisol, Steri-Strips, Telfa, and a Tegaderm dressing were then applied.  Estimated blood loss was 100 mls.  The patient received 2 L of crystalloid replacement.  He tolerated the procedure well was returned to recovery room in satisfactory condition.    CC: ALEJANDRA Blackwood M.D. Thomas H. Schwarcz, MD  08/14/17  1:57 PM    Dr. Mendoza Ellington was asked to assist with the operation was present from the incision until the closure.

## 2017-08-14 NOTE — PLAN OF CARE
Problem: Carotid Endarterectomy (Adult)  Goal: Signs and Symptoms of Listed Potential Problems Will be Absent or Manageable (Carotid Endarterectomy)  Outcome: Ongoing (interventions implemented as appropriate)    08/14/17 1822   Carotid Endarterectomy   Problems Assessed (Carotid Endarterectomy) pain   Problems Present (Carotid Endarterectomy) none

## 2017-08-14 NOTE — INTERVAL H&P NOTE
"[facility] Pre-op    Full history and physical note from office is up to date.  See office note attached.    BP (!) 157/103 (BP Location: Right arm, Patient Position: Lying)  Pulse 99  Temp 97.9 °F (36.6 °C) (Temporal Artery )   Resp 16  Ht 68\" (172.7 cm)  Wt 168 lb (76.2 kg)  SpO2 99%  BMI 25.54 kg/m2    IMM:  Influenza:  no  Pneumococcal:  no  Tetanus:  unknown    Cancer Staging (if applicable)  Cancer Patient: __ yes _x_no __unknown__N/A; If yes, clinical stage T:__ N:__M:__, stage group or __N/A    Dionne Morin, APRN 8/14/2017 9:49 AM    "

## 2017-08-14 NOTE — PLAN OF CARE
Problem: Perioperative Period (Adult)  Goal: Signs and Symptoms of Listed Potential Problems Will be Absent or Manageable (Perioperative Period)  Outcome: Ongoing (interventions implemented as appropriate)    08/14/17 3743   Perioperative Period   Problems Assessed (Perioperative Period) pain   Problems Present (Perioperative Period) pain;none

## 2017-08-14 NOTE — ANESTHESIA PROCEDURE NOTES
Airway  Urgency: elective    Airway not difficult    General Information and Staff    Patient location during procedure: OR  CRNA: RAUL DUNLAP    Indications and Patient Condition  Indications for airway management: airway protection    Preoxygenated: yes  MILS not maintained throughout  Mask difficulty assessment: 1 - vent by mask    Final Airway Details  Final airway type: endotracheal airway      Successful airway: ETT  Cuffed: yes   Successful intubation technique: direct laryngoscopy  Endotracheal tube insertion site: oral  Blade: Snow  Blade size: #2  ETT size: 7.5 mm  Cormack-Lehane Classification: grade I - full view of glottis  Placement verified by: chest auscultation and capnometry   Measured from: lips  ETT to lips (cm): 20  Number of attempts at approach: 1    Additional Comments  Negative epigastric sounds, Breath sound equal bilaterally with symmetric chest rise and fall.  Teeth Intact, atraumatic

## 2017-08-15 VITALS
WEIGHT: 168 LBS | HEIGHT: 68 IN | SYSTOLIC BLOOD PRESSURE: 139 MMHG | DIASTOLIC BLOOD PRESSURE: 86 MMHG | RESPIRATION RATE: 18 BRPM | HEART RATE: 70 BPM | OXYGEN SATURATION: 96 % | BODY MASS INDEX: 25.46 KG/M2 | TEMPERATURE: 98.4 F

## 2017-08-15 PROCEDURE — 25010000003 CEFAZOLIN IN DEXTROSE 2-4 GM/100ML-% SOLUTION: Performed by: SURGERY

## 2017-08-15 RX ORDER — OXYCODONE HYDROCHLORIDE AND ACETAMINOPHEN 5; 325 MG/1; MG/1
2 TABLET ORAL EVERY 4 HOURS PRN
Qty: 8 TABLET | Refills: 0 | Status: SHIPPED | OUTPATIENT
Start: 2017-08-15 | End: 2017-08-17

## 2017-08-15 RX ADMIN — SODIUM CHLORIDE, POTASSIUM CHLORIDE, SODIUM LACTATE AND CALCIUM CHLORIDE 75 ML/HR: 600; 310; 30; 20 INJECTION, SOLUTION INTRAVENOUS at 04:14

## 2017-08-15 RX ADMIN — FOLIC ACID 1 MG: 1 TABLET ORAL at 08:36

## 2017-08-15 RX ADMIN — Medication 1 TABLET: at 08:36

## 2017-08-15 RX ADMIN — CEFAZOLIN SODIUM 2 G: 2 INJECTION, SOLUTION INTRAVENOUS at 01:29

## 2017-08-15 RX ADMIN — OXYCODONE AND ACETAMINOPHEN 2 TABLET: 5; 325 TABLET ORAL at 06:34

## 2017-08-15 RX ADMIN — ASPIRIN 81 MG: 81 TABLET, COATED ORAL at 08:36

## 2017-08-15 RX ADMIN — Medication 100 MG: at 08:36

## 2017-08-15 NOTE — DISCHARGE SUMMARY
"  VASCULAR SURGERY DISCHARGE SUMMARY        Ammon Kimble    Date of Admission: 8/14/2017  Date of Discharge:  8/15/2017    Discharge Diagnoses:  Principal Problem:    Carotid artery stenosis  Active Problems:    Amaurosis fugax of right eye    GERD (gastroesophageal reflux disease)    Tobacco abuse    Hyperlipidemia      Presenting Problem/History of Present Illness  Amaurosis fugax of right eye [G45.3]  Patient is a 51 y.o. male presented with 3 episodes of amaurosis fugax within the past 2 weeks.  Carotid duplex examination demonstrated the presence of a greater than 80% right internal carotid artery stenosis.  The patient was admitted at this time for carotid endarterectomy per NASCET recommendations.     Procedures Performed  Procedure(s):  RIGHT CAROTID ENDARTERECTOMY WITH DACRON PATCH ANGIOPLASTY 8/14/17    Hospital Course  The patient had a smooth and unremarkable recovery.  Cranial nerves were intact.  He had no motor or sensory deficits other than a small amount of numbness in the area of his incision.  The incision was healing well with no evidence of hematoma at the time of discharge.      Physical Exam on Discharge:    /86  Pulse 70  Temp 98.4 °F (36.9 °C) (Oral)   Resp 18  Ht 68\" (172.7 cm)  Wt 168 lb (76.2 kg)  SpO2 96%  BMI 25.54 kg/m2    The patient had no neurologic deficits.  The neck was healing well with no hematoma.    Discharge Medications   Ammon Kimble   Home Medication Instructions [place]:[id number]    Printed on:08/15/17 1112   Medication Information                      aspirin 81 MG EC tablet  Take 81 mg by mouth Daily.             atorvastatin (LIPITOR) 80 MG tablet  Take 80 mg by mouth Daily.                   Discharge Instructions  1.  Keep incision clean and dry for 2 days.  2.  No driving for 1 week  3.  No heavy lifting or straining for 1 month    Follow-up Appointments  Dr. Good in 2-3 weeks    CC to: Gabe Mckeon M.D.   ALEJANDRA Dunn Hao" MD Florentino  08/15/17  11:17 AM

## 2017-08-15 NOTE — PROGRESS NOTES
Adult Nutrition  Assessment/PES    Patient Name:  Ammon Kimble  YOB: 1965  MRN: [id number]  Admit Date:  8/14/2017    Assessment Date:  8/15/2017        Reason for Assessment       08/15/17 1059    Reason for Assessment    Reason For Assessment/Visit multidisciplinary rounds    Time Spent (min) 20              Nutrition/Diet History       08/15/17 1059    Nutrition/Diet History    Reported/Observed By RN    Other pt eating , drinking, lines out, doing well; should go home later today.                    Nutrition Prescription Ordered       08/15/17 1103    Nutrition Prescription PO    Current PO Diet Regular    Common Modifiers Cardiac                Problem/Interventions:                    Nutrition Intervention       08/15/17 1104    Nutrition Intervention    RD/Tech Action Advise alternate selection;Menu provided;Follow Tx progress;Care plan reviewd              Education/Evaluation       08/15/17 1104    Monitor/Evaluation    Monitor Per protocol        Comments:        Electronically signed by:  Merlyn Singh RD  08/15/17 11:04 AM

## 2017-08-15 NOTE — PROGRESS NOTES
Discharge Planning Assessment  [facility]     Patient Name: Ammon Kimble  MRN: [id number]  Today's Date: 8/15/2017    Admit Date: 8/14/2017          Discharge Needs Assessment       08/15/17 0820    Living Environment    Lives With alone    Living Arrangements house    Home Accessibility bed and bath on same level   2 level home he stays on the 1st level that has bed and bath.    Number of Stairs to Enter Home 1    Transportation Available car;family or friend will provide    Living Environment Comment --   Pt lives alone in a 2 level home but he stays on the 1st level that has bed and bath.    Living Environment    Provides Primary Care For no one, unable/limited ability to care for self    Quality Of Family Relationships supportive    Able to Return to Prior Living Arrangements yes    Discharge Needs Assessment    Concerns To Be Addressed no discharge needs identified    Readmission Within The Last 30 Days no previous admission in last 30 days    Equipment Currently Used at Home none    Discharge Contact Information if Applicable Ede Kimble (father)  [phone]            Discharge Plan       08/15/17 0823    Case Management/Social Work Plan    Plan Home    Patient/Family In Agreement With Plan yes    Additional Comments CM met with pt in room to discuss initial discharge plan. Pt lives alone in a 2 level home in [facility]. Pt is independent with ADL's pt still works. Pt denies using HH or DME.         Discharge Placement     No information found                Demographic Summary       08/15/17 0818    Referral Information    Admission Type inpatient    Arrived From admitted as an inpatient    Referral Source admission list    Reason For Consult discharge planning    Record Reviewed clinical discipline documentation;history and physical    Contact Information    Permission Granted to Share Information With     Primary Care Physician Information    Name Jono Larios  Status       08/15/17 0818    Functional Status Prior    Ambulation 0-->independent    Transferring 0-->independent    Toileting 0-->independent    Bathing 0-->independent    Dressing 0-->independent    Eating 0-->independent    Communication 0-->understands/communicates without difficulty    Swallowing 0-->swallows foods/liquids without difficulty    Prior Functional Level Comment --   independent    IADL    Medications independent    Meal Preparation independent    Housekeeping independent    Laundry independent    Shopping independent    Oral Care independent    Activity Tolerance    Usual Activity Tolerance excellent    Employment/Financial    Financial Concerns --   Pt has insurance to cover his medication he gets his medication filled at [facility].            Psychosocial     None            Abuse/Neglect     None            Legal     None            Substance Abuse     None            Patient Forms     None          Trena Phan RN

## 2017-08-15 NOTE — PLAN OF CARE
Problem: Patient Care Overview (Adult)  Goal: Plan of Care Review  Outcome: Ongoing (interventions implemented as appropriate)    Problem: Perioperative Period (Adult)  Goal: Signs and Symptoms of Listed Potential Problems Will be Absent or Manageable (Perioperative Period)  Outcome: Ongoing (interventions implemented as appropriate)    Problem: Carotid Endarterectomy (Adult)  Goal: Signs and Symptoms of Listed Potential Problems Will be Absent or Manageable (Carotid Endarterectomy)  Outcome: Ongoing (interventions implemented as appropriate)

## 2017-08-15 NOTE — PROGRESS NOTES
VASCULAR SURGERY PROGRESS NOTE     08/15/17    Ammon Kimble  [id number]  1965    SUBJECTIVE  C/O of some neck numbness  No H/A    OBJECTIVE    Vital Signs  Temp:  [97.6 °F (36.4 °C)-98.4 °F (36.9 °C)] 98.4 °F (36.9 °C)  Heart Rate:  [54-89] 70  Resp:  [14-20] 18  BP: ()/(31-88) 139/86  Arterial Line BP: ()/(44-77) 130/73    Physical Exam:    General: Alert, cooperative, in no acute distress    Neck:  Incision healing well, no hematoma    Neuro: Alert and oriented x 3, CN grossly intact,     no gross motor or sensory deficits    ASSESSMENT   1.  Excellent result after CEA    PLAN  1.  D/C    Jonathan Good MD  08/15/17  11:16 AM

## 2021-07-09 DIAGNOSIS — Z01.812 BLOOD TESTS PRIOR TO TREATMENT OR PROCEDURE: Primary | ICD-10-CM

## (undated) DEVICE — PK VASC 10

## (undated) DEVICE — NDL HYPO SFTY GLD 21G 1 1/2IN

## (undated) DEVICE — SUT SILK 2/0 TIES 18IN A185H

## (undated) DEVICE — DRSNG TELFA PAD NONADH STR 1S 3X8IN

## (undated) DEVICE — BLD TONG INDIV/WRP A/ 6IN STRL

## (undated) DEVICE — GOWN,REINF,POLY,ECL,PP SLV,XL: Brand: MEDLINE

## (undated) DEVICE — APPL DURAPREP IODOPHOR APL 26ML

## (undated) DEVICE — ENCORE® LATEX MICRO SIZE 7, STERILE LATEX POWDER-FREE SURGICAL GLOVE: Brand: ENCORE

## (undated) DEVICE — DRSNG SURESITE WNDW 4X4.5

## (undated) DEVICE — SUT PROLN 6/0 BV1 D/A 30IN 8709H

## (undated) DEVICE — SOL LR 1000ML

## (undated) DEVICE — MEDI-VAC NON-CONDUCTIVE SUCTION TUBING: Brand: CARDINAL HEALTH

## (undated) DEVICE — MAGNETIC DRAPE: Brand: DEVON

## (undated) DEVICE — ADHS LIQ MASTISOL 2/3ML

## (undated) DEVICE — SUT PROLN 7/0 BV1 D/A 24IN 8702H

## (undated) DEVICE — INTRAOPERATIVE COVER KIT, 10 PACK: Brand: SITE-RITE

## (undated) DEVICE — SYR TB SFTY 1CC W 27G 1/2IN NDL BX/100

## (undated) DEVICE — ST PRIM GRVTY NDLESS 3 INJ PORT 105IN

## (undated) DEVICE — MEDI-VAC YANKAUER SUCTION HANDLE W/BULBOUS TIP: Brand: CARDINAL HEALTH

## (undated) DEVICE — DECANT BG O JET

## (undated) DEVICE — SYR LUERLOK 30CC

## (undated) DEVICE — CANNULA,ADULT,SOFT-TOUCH,7TUBE,SC: Brand: MEDLINE

## (undated) DEVICE — 3M™ STERI-STRIP™ REINFORCED ADHESIVE SKIN CLOSURES, R1547, 1/2 IN X 4 IN (12 MM X 100 MM), 6 STRIPS/ENVELOPE: Brand: 3M™ STERI-STRIP™

## (undated) DEVICE — GLV SURG SIGNATURE TOUCH PF LTX 7.5 STRL BX/50

## (undated) DEVICE — SUT SILK 3/0 TIES 18IN A184H

## (undated) DEVICE — AIRWY SZ11

## (undated) DEVICE — 3M™ IOBAN™ 2 ANTIMICROBIAL INCISE DRAPE 6650EZ: Brand: IOBAN™ 2

## (undated) DEVICE — Device: Brand: MEDEX

## (undated) DEVICE — ANTIBACTERIAL UNDYED BRAIDED (POLYGLACTIN 910), SYNTHETIC ABSORBABLE SUTURE: Brand: COATED VICRYL

## (undated) DEVICE — HDRST POSTIN FM CRDL TRACH SLOT 9X8X4IN

## (undated) DEVICE — SENSR O2 OXIMAX FNGR A/ 18IN NONSTR

## (undated) DEVICE — SUT MNCRYL PLS ANTIB UD 4/0 PS2 18IN

## (undated) DEVICE — SUT SILK 4/0 TIES 18IN A183H

## (undated) DEVICE — NDL HYPO SFTY PROEDGE 27G 1 1/4IN GRY